# Patient Record
Sex: FEMALE | Employment: FULL TIME
[De-identification: names, ages, dates, MRNs, and addresses within clinical notes are randomized per-mention and may not be internally consistent; named-entity substitution may affect disease eponyms.]

---

## 2024-03-20 ENCOUNTER — TRANSCRIBE ORDERS (OUTPATIENT)
Dept: OTHER | Age: 64
End: 2024-03-20

## 2024-03-20 ENCOUNTER — MEDICAL CORRESPONDENCE (OUTPATIENT)
Dept: HEALTH INFORMATION MANAGEMENT | Facility: CLINIC | Age: 64
End: 2024-03-20

## 2024-03-20 DIAGNOSIS — J32.3 CHRONIC SPHENOIDAL SINUSITIS: Primary | ICD-10-CM

## 2024-03-21 ENCOUNTER — TELEPHONE (OUTPATIENT)
Dept: OTOLARYNGOLOGY | Facility: CLINIC | Age: 64
End: 2024-03-21

## 2024-03-21 NOTE — TELEPHONE ENCOUNTER
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 DX not listed on protocols, no response on secure chat    Action Taken: Message routed to:  [facility] ([facility] ENT    Travel Screening: Not Applicable

## 2024-03-21 NOTE — TELEPHONE ENCOUNTER
Left Voicemail (1st Attempt) for the patient to call back and schedule the following:    Appointment type: New Rhinology  Provider: Danita Altman Boyer  Return date: next available  Specialty phone number: [phone]  Additional appointment(s) needed:   Additonal Notes:

## 2024-03-27 NOTE — TELEPHONE ENCOUNTER
"FUTURE VISIT INFORMATION      FUTURE VISIT INFORMATION:  Date: 7/12/24  Time: 3:20 PM  Location: [facility] -[facility]  REFERRAL INFORMATION:  Referring provider:  Abigail Sanchez MD   Referring providers clinic:  [facility]  Reason for visit/diagnosis:  J32.3 (ICD-10-CM) - Chronic sphenoidal sinusitis, Referral from Abigail Sanchez, Referral notes in EPIC Pt made appt for [facility] location     RECORDS REQUESTED FROM      Clinic name Comments Records Status Imaging Status   [facility] 3/27/24 referral/ OV with Abigail Sanchez MD     2/28/23 ENDSCOPIC SINUS SURGERY PLUS FUSION including right sphenoidotomy with removal of tissue, right meredith bullosa take-down, AND SEPTOPLASTY  CE    [facility] 3/15/24 CT fusion   11/21/22 CT fusion CE PACS           \"Please notify/message CSS if patient completed outside imaging prior to scheduled appointment and/or any outside records that might have been missed at pre visit -Thank you\"  "

## 2024-07-11 PROBLEM — J32.3 CHRONIC SPHENOIDAL SINUSITIS: Status: ACTIVE | Noted: 2024-07-11

## 2024-07-12 ENCOUNTER — PRE VISIT (OUTPATIENT)
Dept: OTOLARYNGOLOGY | Facility: CLINIC | Age: 64
End: 2024-07-12

## 2024-07-12 ENCOUNTER — OFFICE VISIT (OUTPATIENT)
Dept: OTOLARYNGOLOGY | Facility: CLINIC | Age: 64
End: 2024-07-12
Payer: COMMERCIAL

## 2024-07-12 VITALS
DIASTOLIC BLOOD PRESSURE: 71 MMHG | HEIGHT: 64 IN | OXYGEN SATURATION: 97 % | WEIGHT: 207.5 LBS | BODY MASS INDEX: 35.42 KG/M2 | SYSTOLIC BLOOD PRESSURE: 117 MMHG | HEART RATE: 73 BPM

## 2024-07-12 DIAGNOSIS — J32.3 CHRONIC SPHENOIDAL SINUSITIS: Primary | ICD-10-CM

## 2024-07-12 PROCEDURE — 99204 OFFICE O/P NEW MOD 45 MIN: CPT | Performed by: STUDENT IN AN ORGANIZED HEALTH CARE EDUCATION/TRAINING PROGRAM

## 2024-07-12 RX ORDER — DOXYCYCLINE HYCLATE 100 MG
TABLET ORAL
COMMUNITY
Start: 2024-03-13 | End: 2024-07-24

## 2024-07-12 RX ORDER — ERGOCALCIFEROL 1.25 MG/1
CAPSULE, LIQUID FILLED ORAL
COMMUNITY
Start: 2023-10-25 | End: 2024-07-24

## 2024-07-12 RX ORDER — BENZOYL PEROXIDE 10 G/100G
SUSPENSION TOPICAL PRN
COMMUNITY
Start: 2022-11-22

## 2024-07-12 RX ORDER — GABAPENTIN 300 MG/1
300 CAPSULE ORAL 2 TIMES DAILY
COMMUNITY
Start: 2021-01-08

## 2024-07-12 RX ORDER — BUPROPION HYDROCHLORIDE 300 MG/1
300 TABLET ORAL EVERY MORNING
COMMUNITY

## 2024-07-12 RX ORDER — NIRMATRELVIR AND RITONAVIR 300-100 MG
KIT ORAL
COMMUNITY
Start: 2023-09-13 | End: 2024-07-24

## 2024-07-12 RX ORDER — PREDNISONE 20 MG/1
2 TABLET ORAL
COMMUNITY
Start: 2024-04-30

## 2024-07-12 RX ORDER — ALBUTEROL SULFATE 90 UG/1
1-2 AEROSOL, METERED RESPIRATORY (INHALATION) EVERY 4 HOURS PRN
COMMUNITY
Start: 2023-09-25

## 2024-07-12 RX ORDER — MOMETASONE FUROATE 110 UG/1
INHALANT RESPIRATORY (INHALATION)
COMMUNITY
End: 2024-07-24

## 2024-07-12 RX ORDER — MUPIROCIN 20 MG/G
OINTMENT TOPICAL PRN
COMMUNITY
Start: 2023-03-20

## 2024-07-12 RX ORDER — CYCLOBENZAPRINE HCL 10 MG
10 TABLET ORAL 3 TIMES DAILY PRN
COMMUNITY
Start: 2023-10-20

## 2024-07-12 RX ORDER — TRAZODONE HYDROCHLORIDE 50 MG/1
150 TABLET, FILM COATED ORAL AT BEDTIME
COMMUNITY
Start: 2021-06-22 | End: 2025-04-12

## 2024-07-12 RX ORDER — FLUTICASONE PROPIONATE 50 MCG
2 SPRAY, SUSPENSION (ML) NASAL EVERY MORNING
COMMUNITY
Start: 2021-06-06

## 2024-07-12 RX ORDER — TRIAMCINOLONE ACETONIDE 1 MG/G
CREAM TOPICAL
COMMUNITY
Start: 2024-04-12

## 2024-07-12 ASSESSMENT — PAIN SCALES - GENERAL: PAINLEVEL: MILD PAIN (2)

## 2024-07-12 NOTE — LETTER
7/12/2024       RE: Janeen Gregg  [address] [facility] [address]     Dear Colleague,    Thank you for referring your patient, Janeen Gregg, to the [facility] [place] [facility]. Please see a copy of my visit note below.      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  --we discussed that this is often times an incidental finding and does not typically represent an acute infection  --thus, indications for removal vary based on symptom, health status, and other medical issues  --she does have plans to have a knee replaced and was told that it would be the strong preference to have the sinus cleared prior to implanting hardware - I would agree with this evaluation    Plan: IMAGESTREAM RECORDING ORDER    I discussed the risks, benefits, and alternatives to endoscopic sinonasal surgery, including but not limited to: pain, bleeding, infection, CSF leak, orbital injury resulting in vision changes and/or vision loss, septal perforation and/or hematoma, dental hypesthesia, facial numbness, need for continued medical management after surgery, and need for additional procedures, among others. She was allowed to ask questions, which I answered to the best of my ability. After this discussion, surgery was elected.     Chief Complaint: chronic sphenoid sinusitis    History of Present Illness:   Janeen Gregg is a 63 year old female who presents for consultation regarding chronic sphenoid sinusitis    Headache and post nasal drip occasionally  Was found to have likely right sphenoid fungal ball  Underwent right CB  "removal/septoplasty/sphenoid - persistence of symptoms and imaging demonstrated continued fungal ball    Did well after surgery, no major issues    Review of systems: A 14-point review of systems has been conducted and was negative for any notable symptoms, except as dictated in the history of present illness.     Medical History:  No past medical history on file.     Surgical History:   Past Surgical History:   Procedure Laterality Date     IR LUMBAR PUNCTURE  5/21/2021     IR LUMBAR PUNCTURE  9/16/2022     IR LUMBAR PUNCTURE  1/4/2019     IR LUMBAR PUNCTURE  12/18/2020     IR LUMBAR PUNCTURE  1/12/2021        Family History:  No family history on file.     Social History:   Social History     Socioeconomic History     Marital status:      Social Determinants of Health     Financial Resource Strain: Not At Risk (10/20/2023)    Received from [facility]    Financial Resource Strain      Is it hard for you to pay for the very basics like food, housing, medical care or heating?: No   Food Insecurity: Not At Risk (10/20/2023)    Received from [facility]    Food Insecurity      Does your food run out before you have the money to buy more?: No   Transportation Needs: Not At Risk (10/20/2023)    Received from [facility]    Transportation Needs      Does a lack of transportation keep you from your medical appointments or from getting your medications?: No        Physical Exam:  Vital signs: /71 (BP Location: Left arm, Patient Position: Sitting, Cuff Size: Adult Large)   Pulse 73   Ht 1.626 m (5' 4\")   Wt 94.1 kg (207 lb 8 oz)   SpO2 97%   BMI 35.62 kg/m     General Appearance: No acute distress, appropriate demeanor, conversant  Eyes: moist conjunctivae; EOMI; pupils symmetric; visual acuity grossly intact; no proptosis  Head: normocephalic; overall symmetric appearance without deformity  Face: overall symmetric without deformity  Ears: Normal " appearance of external ear; external meatus normal in appearance; TMs intact without perforation bilaterally;   Nose: No external deformity; septum (not significantly deviated) ; inferior turbinates (non hypertrophic)   Oral Cavity/oropharynx: Normal appearance of mucosa; tongue midline; no mass or lesions; oropharynx without obvious mucosal abnormality  Neck: no palpable lymphadenopathy; thyroid without palpable nodules  Lungs: symmetric chest rise; no wheezing  CV: Good distal perfusion; normal hear rate  Extremities: No deformity  Neurologic Exam: Cranial nerves II-XII are grossly intact; no focal deficit    Imaging Review:  3/15/2024 - CT sinus - primary review demonstrates persistent right sphenoid partial opacification with heterogenous signal suggestive of mycetoma/rhinolith    Carlton Samayoa MD    [facility]  Rhinology  Endoscopic Skull Base Surgery  [facility]  Department of Otolaryngology - Head & Neck Surgery        Again, thank you for allowing me to participate in the care of your patient.      Sincerely,    Carlton Samayoa MD

## 2024-07-12 NOTE — NURSING NOTE
"Chief Complaint   Patient presents with    Consult   Blood pressure 117/71, pulse 73, height 1.626 m (5' 4\"), weight 94.1 kg (207 lb 8 oz), SpO2 97%. Nasir Friedman, EMT    "

## 2024-07-12 NOTE — PATIENT INSTRUCTIONS
You were seen in the ENT Clinic today by Dr. Samayoa. If you have any questions or concerns after your appointment, please contact us (see below)    The following has been recommended for you based upon your appointment today:  Surgery schedulers will call you to scheduled your surgery and post op follow up  Pre op assessment within 30 days of surgery with your primary care doctor or our PAC clinic     Please return to clinic 1 week after surgery for post op follow up    How to Contact Us:  Send a [employer] message to your provider. Our team will respond to you via [employer]. Occasionally, we will need to call you to get further information.  For urgent matters (Monday-Friday), call the ENT Clinic: [phone] and speak with a call center team member - they will route your call appropriately.   If you'd like to speak directly with a nurse, please find our contact information below. We do our best to check voicemail frequently throughout the day, and will work to call you back within 1-2 days. For urgent matters, please use the general clinic phone numbers listed above.    Patricia THOMAS RN, BSN   RN Care Coordinator, ENT Clinic  [facility]  Direct: [phone]  Yareli KRUGER LPN  Direct: [phone]            Surgery Instructions - Dr. Samayoa     PREPARING FOR SURGERY  You will need a preoperative assessment within 30 days of your surgery. This can be with your primary care doctor or our PAC clinic.   Before surgery, call the clinic:   If there is any change in your health, or you are having more frequent or severe symptoms   If you develop a cold or flu, or if you test positive for COVID-19   If you have any questions about what to expect before, during, or after surgery   If you need assistance filling out paperwork for short-term disability or FMLA, or you need a letter excusing you from work         MEDICATIONS BEFORE SURGERY  You will receive specific instructions about how to take your medications at  your preoperative assessment visit. Talk to your care team about every medication that you take, including over-the-counter medications and supplements. Some medications can make you bleed too much during surgery, and some change how well anesthesia drugs work. Follow these general instructions for common medications, unless otherwise directed.      INSTRUCTIONS MEDICATION   Hold for 7 days before surgery  Supplements   Multivitamins   Aspirin (note: some medications can contain aspirin, like [place])  Naproxen (Aleve)  Ibuprofen (Advil, Motrin)  Any weight loss medication       Talk with the Preoperative Assessment Center (PAC) about how to take these medications before surgery     Insulin or oral medications for diabetes   Blood pressure medications   Anticoagulant medications, including:    warfarin (Coumadin)   enoxaparin (Lovenox)   dabigatran (Pradaxa)   apixaban (Eliquis)   rivaroxaban (Xarelto)   Antiplatelet medications, including:   clopidogrel bisulfate (Plavix)   cilostazol (Pletal)       Okay to keep taking for pain, as needed     Acetaminophen (Tylenol)          EATING AND DRINKING BEFORE SURGERY  Eat and drink as usual until 8 hours before your surgery arrival time. After that, no food or milk.   Drink clear liquids until 1 hour before your surgery arrival time. Clear liquids are liquids you can see through, like water, Gatorade, and Propel. You may also have black coffee and tea (no cream or milk).   Nothing by mouth within 1 hour of your surgery arrival time. This includes gum, candy, and breath mints.   If your care team tells you take medicine on the morning of surgery, it is okay to take medicine with a sip of water.   Do not drink alcohol for at least 24 hours before surgery. Do not use marijuana for 7 days prior to surgery.        PREVENTING INFECTION  Shower or bathe the night before and morning of your surgery following the instructions on your handout,  Showering Before Surgery.     Note: Only use the special antiseptic soap from your neck to your toes. Your care team will clean the skin at your surgery site.   Don t shave or clip hair near your surgery site. We ll remove the hair if needed.   Having diabetes and/or smoking can cause delayed wound healing and increase your risk of a surgical site infection. Quitting smoking and lowering your blood sugars can make a big difference. If you would like support with this, please let us know or work with your primary care provider.          SMOKING AND NICOTINE  Don t smoke or vape the morning of surgery. You may chew nicotine gum up to 2 hours before surgery. A nicotine patch is okay.   We strongly recommend quitting smoking and other tobacco products. Nicotine can cause delayed healing and wound complications after surgery.         PLANNING AHEAD - FINANCES  [email or web address]  [facility] Billing: Please call [phone], if you wish to pay a bill.  [facility] Financial Counselors: Please call [phone], if you have questions about possible costs and coverage or to discuss options if you don't have enough - or no - insurance for your care.  [facility] Cost of Care Estimates: Please visit the website to view our online estimate tool. If you have additional questions, call [phone] for estimates.  Our financial team will contact your insurance company as soon as surgery is scheduled. If your insurance company requires a prior authorization (pre-approval), our financial team will complete these necessary steps. Typically, we don t encounter many issues with insurance denying coverage for skull base surgery.    It is a good idea to call your insurance company and let them know you re having surgery. Ask questions about your deductible, co-insurance, and what of out-of-pocket costs you will be responsible for.         HEALTHCARE DIRECTIVE  Consider preparing a Health Care  Directive and choosing a Health Care Agent. A Health Care Directive is a written plan outlining your values and priorities for your future medical treatment. A Health Care Agent makes health care decisions based on your wishes if you are unable to communicate.   Download a document, information materials or register for a free class on advance care planning and creating a health care directive by visiting [email or web address], calling [phone], or emailing [email or web address].   If you have a health care directive or choose to complete a healthcare directive before surgery, please upload the document to [employer]. This will be attached to your chart. You may also want to bring a copy with you on the day of surgery.         YOUR SURGERY DAY  Parking:   Both self-park and  parking are available at the [facility] and [facility] buildings. If the Patient Visitors Ramp is full or if a patient or visitor has limited mobility, please follow the signs to the  located at the main entrance. For more information, please visit: [email or web address]    What to bring:  Photo ID and insurance card   Copy of your healthcare directive (if you have one)   Glasses with case (you can t wear contacts during surgery)   Hearing aids with case   If you have a pacemaker, ICD (defibrillator) or other implant, please bring the ID card   If you have an implanted stimulator, please bring the remote control   If you have a legal guardian, bring a copy of the certified (court-stamped) guardianship papers   What to leave at home:  Please remove any jewelry, including body piercings   Leave jewelry and other valuables at home   Medications/supplements         HOME RECOVERY  Everyone's recovery is different based on their health condition, age, and overall health status.   Plan to have an adult stay with you for at least 24 hours after you get home from the hospital.   Arrange  for help at home. It is common to feel tired for the first few days and you will need to avoid some activities. Many people find that having someone help with household tasks, such as cleaning, cooking, and running errands is helpful.          ACTIVITY RESTRICTIONS  Avoid strenuous exercise and activity until your follow up appointment with Dr. Samayoa.  Do not lift anything greater than 10 pounds (about a gallon of milk).       PAIN MANAGEMENT  It is normal to have some pain after surgery. Most people do not experience severe pain. If you are experiencing severe pain at the incision site or severe headaches, please let us know right away.        CALL THE CLINIC IF YOU EXPERIENCE:  Drainage, swelling, fluid collection, or increased redness around the incision   Fever, or temperature of 101 degrees or higher   Pain not managed by your pain medication   Severe constipation or abdominal pain  Running low on prescription medication that was prescribed to you at the time of surgery   Any other symptoms that you have questions about     After clinic hours or on the weekends, please call the hospital  at [phone] and ask to speak with the ENT resident on call. If you have a serious emergency, call 911 or come to the emergency room. If you can, come to the hospital where you had your surgery.      During clinic hours:   Department  Phone    Ear, Nose, & Throat (ENT)     [phone]      RN Care Coordinators:  We do our best to check voicemail frequently throughout the day. For urgent matters, please use the general clinic phone number listed above. Your call will be routed appropriately.      Patricia THOMAS RN, BSN   RN Care Coordinator ENT   Direct: [phone]   Yareli MAHER LPN   Direct: [phone]

## 2024-07-14 ENCOUNTER — PREP FOR PROCEDURE (OUTPATIENT)
Dept: OTOLARYNGOLOGY | Facility: CLINIC | Age: 64
End: 2024-07-14
Payer: COMMERCIAL

## 2024-07-14 DIAGNOSIS — J32.3 CHRONIC SPHENOIDAL SINUSITIS: Primary | ICD-10-CM

## 2024-07-14 RX ORDER — CEFAZOLIN SODIUM 2 G/50ML
2 SOLUTION INTRAVENOUS
Status: CANCELLED | OUTPATIENT
Start: 2024-07-14

## 2024-07-14 RX ORDER — DEXAMETHASONE SODIUM PHOSPHATE 4 MG/ML
10 INJECTION, SOLUTION INTRA-ARTICULAR; INTRALESIONAL; INTRAMUSCULAR; INTRAVENOUS; SOFT TISSUE ONCE
Status: CANCELLED | OUTPATIENT
Start: 2024-07-14 | End: 2024-07-14

## 2024-07-14 RX ORDER — CEFAZOLIN SODIUM 2 G/50ML
2 SOLUTION INTRAVENOUS SEE ADMIN INSTRUCTIONS
Status: CANCELLED | OUTPATIENT
Start: 2024-07-14

## 2024-07-14 NOTE — PROGRESS NOTES
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  --we discussed that this is often times an incidental finding and does not typically represent an acute infection  --thus, indications for removal vary based on symptom, health status, and other medical issues  --she does have plans to have a knee replaced and was told that it would be the strong preference to have the sinus cleared prior to implanting hardware - I would agree with this evaluation    Plan: IMAGESTREAM RECORDING ORDER    I discussed the risks, benefits, and alternatives to endoscopic sinonasal surgery, including but not limited to: pain, bleeding, infection, CSF leak, orbital injury resulting in vision changes and/or vision loss, septal perforation and/or hematoma, dental hypesthesia, facial numbness, need for continued medical management after surgery, and need for additional procedures, among others. She was allowed to ask questions, which I answered to the best of my ability. After this discussion, surgery was elected.     Chief Complaint: chronic sphenoid sinusitis    History of Present Illness:   Janeen Gregg is a 63 year old female who presents for consultation regarding chronic sphenoid sinusitis    Headache and post nasal drip occasionally  Was found to have likely right sphenoid fungal ball  Underwent right CB removal/septoplasty/sphenoid - persistence of symptoms and imaging demonstrated continued fungal ball    Did well after surgery, no major issues    Review of systems: A 14-point review of systems has been conducted and was negative for any notable symptoms, except as dictated in the history of present illness.     Medical History:  No past  "medical history on file.     Surgical History:   Past Surgical History:   Procedure Laterality Date    IR LUMBAR PUNCTURE  5/21/2021    IR LUMBAR PUNCTURE  9/16/2022    IR LUMBAR PUNCTURE  1/4/2019    IR LUMBAR PUNCTURE  12/18/2020    IR LUMBAR PUNCTURE  1/12/2021        Family History:  No family history on file.     Social History:   Social History     Socioeconomic History    Marital status:      Social Determinants of Health     Financial Resource Strain: Not At Risk (10/20/2023)    Received from [employer]    Financial Resource Strain     Is it hard for you to pay for the very basics like food, housing, medical care or heating?: No   Food Insecurity: Not At Risk (10/20/2023)    Received from [employer]    Food Insecurity     Does your food run out before you have the money to buy more?: No   Transportation Needs: Not At Risk (10/20/2023)    Received from [facility]    Transportation Needs     Does a lack of transportation keep you from your medical appointments or from getting your medications?: No        Physical Exam:  Vital signs: /71 (BP Location: Left arm, Patient Position: Sitting, Cuff Size: Adult Large)   Pulse 73   Ht 1.626 m (5' 4\")   Wt 94.1 kg (207 lb 8 oz)   SpO2 97%   BMI 35.62 kg/m     General Appearance: No acute distress, appropriate demeanor, conversant  Eyes: moist conjunctivae; EOMI; pupils symmetric; visual acuity grossly intact; no proptosis  Head: normocephalic; overall symmetric appearance without deformity  Face: overall symmetric without deformity  Ears: Normal appearance of external ear; external meatus normal in appearance; TMs intact without perforation bilaterally;   Nose: No external deformity; septum (not significantly deviated) ; inferior turbinates (non hypertrophic)   Oral Cavity/oropharynx: Normal appearance of mucosa; tongue midline; no mass or lesions; oropharynx without obvious mucosal " abnormality  Neck: no palpable lymphadenopathy; thyroid without palpable nodules  Lungs: symmetric chest rise; no wheezing  CV: Good distal perfusion; normal hear rate  Extremities: No deformity  Neurologic Exam: Cranial nerves II-XII are grossly intact; no focal deficit    Imaging Review:  3/15/2024 - CT sinus - primary review demonstrates persistent right sphenoid partial opacification with heterogenous signal suggestive of mycetoma/rhinolith    Carlton Samayoa MD    [facility]  Rhinology  Endoscopic Skull Base Surgery  [facility]  Department of Otolaryngology - Head & Neck Surgery

## 2024-07-16 ENCOUNTER — TELEPHONE (OUTPATIENT)
Dept: OTOLARYNGOLOGY | Facility: CLINIC | Age: 64
End: 2024-07-16
Payer: COMMERCIAL

## 2024-07-16 NOTE — TELEPHONE ENCOUNTER
FUTURE VISIT INFORMATION      SURGERY INFORMATION:  Date: 24  Location: [facility] OR  Surgeon:  Carlton Samayoa MD   Anesthesia Type:  General  Procedure: Image guided right maxillary antrostomy, total ethmoidectomy, and sphenoidotomy   Consult: ov 24    RECORDS REQUESTED FROM:       Primary Care Provider: Padma Reyes MD  - [facility]    Most recent EKG+ Tracin24- [facility]

## 2024-07-16 NOTE — TELEPHONE ENCOUNTER
Scheduled surgery with Dr. Carlton Samayoa on 7/31/2024    Spoke with: Janeen (patient)    Surgery is located at [facility]    Patient will be seen for their H&P by PAC on 7/24/2024 at 530pm - Provided address & floor number    Does patient need a consult before upcoming surgery? No    Anesthesia type: General    Requested Imaging required for surgery: No    Patient is scheduled for their 1 week post op on 8/9/2024 at 4:25PM with Dr. Carlton Samayoa    Patient will receive their surgery packet via [employer] per their preference    Patient was not provided a start time for surgery & is aware they will receive this information 2-3 days before surgery    Informed patient that they will need a  for surgery. They do not yet know who their  will be and will coordinate before surgery date    Additional comments:      Patient was instructed to call back with any further questions or concerns.     Titi Bradley on 7/16/2024 at 2:58 PM

## 2024-07-24 ENCOUNTER — VIRTUAL VISIT (OUTPATIENT)
Dept: SURGERY | Facility: CLINIC | Age: 64
End: 2024-07-24
Payer: COMMERCIAL

## 2024-07-24 ENCOUNTER — ANESTHESIA EVENT (OUTPATIENT)
Dept: SURGERY | Facility: AMBULATORY SURGERY CENTER | Age: 64
End: 2024-07-24
Payer: COMMERCIAL

## 2024-07-24 ENCOUNTER — PRE VISIT (OUTPATIENT)
Dept: SURGERY | Facility: CLINIC | Age: 64
End: 2024-07-24

## 2024-07-24 VITALS — HEIGHT: 64 IN | WEIGHT: 205 LBS | BODY MASS INDEX: 35 KG/M2

## 2024-07-24 DIAGNOSIS — Z01.818 PRE-OP EVALUATION: Primary | ICD-10-CM

## 2024-07-24 PROCEDURE — 99202 OFFICE O/P NEW SF 15 MIN: CPT | Mod: 95 | Performed by: PHYSICIAN ASSISTANT

## 2024-07-24 RX ORDER — POLYETHYLENE GLYCOL 3350 17 G/17G
1 POWDER, FOR SOLUTION ORAL PRN
COMMUNITY

## 2024-07-24 ASSESSMENT — LIFESTYLE VARIABLES: TOBACCO_USE: 1

## 2024-07-24 ASSESSMENT — ENCOUNTER SYMPTOMS: SEIZURES: 0

## 2024-07-24 ASSESSMENT — PAIN SCALES - GENERAL: PAINLEVEL: MILD PAIN (3)

## 2024-07-24 NOTE — H&P
Pre-Operative H & P     CC:  Preoperative exam to assess for increased cardiopulmonary risk while undergoing surgery and anesthesia.    Date of Encounter: 7/24/2024  Primary Care Physician:  No Ref-Primary, Physician     Reason for visit:   Encounter Diagnosis   Name Primary?    Pre-op evaluation Yes       HPI  Janeen Gregg is a 63 year old female who presents for pre-operative H & P in preparation for  Procedure Information       Case: [id number] Date/Time: 07/31/24 0750    Procedure: Image guided right maxillary antrostomy, total ethmoidectomy, and sphenoidotomy (Right: Nose)    Anesthesia type: General    Diagnosis: Chronic sphenoidal sinusitis [J32.3]    Pre-op diagnosis: Chronic sphenoidal sinusitis [J32.3]    Location: [place] [address] / [facility]    Providers: Carlton Samayoa MD            Patient is being evaluated for comorbid conditions of ANGI, PVCs, dyslipidemia, stress incontinence, obesity     Ms. Gregg has known chronic sinusitis.  She follows with  and is now scheduled for surgery as above.    History is obtained from the patient and chart review    Hx of abnormal bleeding or anti-platelet use: denies    Menstrual history: No LMP recorded. Patient is postmenopausal.     Past Medical History  Past Medical History:   Diagnosis Date    Chronic sinusitis, unspecified location     Depression     Dyslipidemia     Female stress incontinence     Obesity     ANGI (obstructive sleep apnea)     Osteopenia     PVC's (premature ventricular contractions)        Past Surgical History  Past Surgical History:   Procedure Laterality Date    IR LUMBAR PUNCTURE  05/21/2021    IR LUMBAR PUNCTURE  09/16/2022    IR LUMBAR PUNCTURE  01/04/2019    IR LUMBAR PUNCTURE  12/18/2020    IR LUMBAR PUNCTURE  01/12/2021    L TKA      oophrectomy      SINUS SURGERY         Prior to Admission Medications  Current Outpatient Medications   Medication Sig Dispense Refill    albuterol  (PROAIR HFA/PROVENTIL HFA/VENTOLIN HFA) 108 (90 Base) MCG/ACT inhaler Inhale 1-2 puffs into the lungs every 4 hours as needed      benzoyl peroxide (BENZOYL PEROXIDE WASH) 10 % external liquid Apply topically as needed      buPROPion (WELLBUTRIN XL) 300 MG 24 hr tablet Take 300 mg by mouth every morning      cholecalciferol (VITAMIN D3) 25 mcg (1000 units) capsule Take 1 capsule by mouth daily      cyclobenzaprine (FLEXERIL) 10 MG tablet Take 10 mg by mouth 3 times daily as needed      fluticasone (FLONASE) 50 MCG/ACT nasal spray Spray 2 sprays into both nostrils every morning      gabapentin (NEURONTIN) 300 MG capsule Take 300 mg by mouth 2 times daily      mupirocin (BACTROBAN) 2 % external ointment Apply topically as needed      polyethylene glycol (MIRALAX) 17 g packet Take 1 packet by mouth as needed for constipation      traZODone (DESYREL) 50 MG tablet Take 150 mg by mouth at bedtime      triamcinolone (KENALOG) 0.1 % external cream Apply topically two times daily as needed for Other (to patch on scalp).      predniSONE (DELTASONE) 20 MG tablet Take 2 tablets by mouth daily at 2 pm (Patient not taking: Reported on 7/12/2024)         Allergies  No Known Allergies    Social History  Social History     Socioeconomic History    Marital status:      Spouse name: Not on file    Number of children: Not on file    Years of education: Not on file    Highest education level: Not on file   Occupational History    Not on file   Tobacco Use    Smoking status: Former     Types: Cigarettes    Smokeless tobacco: Never   Substance and Sexual Activity    Alcohol use: Not Currently    Drug use: Not Currently    Sexual activity: Not on file   Other Topics Concern    Not on file   Social History Narrative    Not on file     Social Determinants of Health     Financial Resource Strain: Not At Risk (10/20/2023)    Received from [employer], [employer]    Financial Resource Strain     Is it hard for you to pay for the  "very basics like food, housing, medical care or heating?: No   Food Insecurity: Not At Risk (10/20/2023)    [facility] [employer]    Food Insecurity     Does your food run out before you have the money to buy more?: No   Transportation Needs: Not At Risk (10/20/2023)    [facility] [employer]    Transportation Needs     Does a lack of transportation keep you from your medical appointments or from getting your medications?: No   Physical Activity: Not on file   Stress: Not on file   Social Connections: Not on file   Interpersonal Safety: Not on file   Housing Stability: Not on file       Family History  Family History   Problem Relation Age of Onset    Post Operative Nausea and Vomiting Daughter     Deep Vein Thrombosis (DVT) No family hx of        Review of Systems  The complete review of systems is negative other than noted in the HPI or here.   Anesthesia Evaluation   Pt has had prior anesthetic.     No history of anesthetic complications       ROS/MED HX  ENT/Pulmonary:     (+) sleep apnea (mouth gaurd), doesn't use CPAP,              tobacco use, Past use,                       Neurologic:  - neg neurologic ROS  (-) no seizures and no CVA   Cardiovascular:     (+) Dyslipidemia - -   -  - -                                 Previous cardiac testing   Echo: Date: Results:    Stress Test:  Date: Results:    ECG Reviewed:  Date: 2/2024 Results:  NSR  Cath:  Date: Results:   (-) taking anticoagulants/antiplatelets   METS/Exercise Tolerance: 4 - Raking leaves, gardening Comment: On feet at work \"running around.\" Can walk a block or ascend stairs without CP. Some ARREGUIN for over a year since having COVID/ sinus issues    Hematologic:  - neg hematologic  ROS  (-) history of blood clots and history of blood transfusion   Musculoskeletal: Comment: Knee OA      GI/Hepatic:  - neg GI/hepatic ROS  (-) GERD   Renal/Genitourinary:  - neg Renal ROS     Endo:     (+)               Obesity " (BMI 35),    (-) chronic steroid usage   Psychiatric/Substance Use:       Infectious Disease:  - neg infectious disease ROS     Malignancy:       Other:            Virtual visit -  No vitals were obtained    Physical Exam  Constitutional: Awake, alert, cooperative, no apparent distress, and appears stated age.  HENT: Normocephalic  Respiratory: non labored breathing   Neurologic: Awake, alert, oriented to name, place and time.   Neuropsychiatric: Calm, cooperative. Normal affect.      Prior Labs/Diagnostic Studies   All labs and imaging personally reviewed     EKG/ stress test - if available please see in ROS above   No results found.       No data to display                  The patient's records and results personally reviewed by this provider.     Outside records reviewed from: Care Everywhere      Assessment    Janeen Gregg is a 63 year old female seen as a PAC referral for risk assessment and optimization for anesthesia.    Plan/Recommendations  Pt will be optimized for the proposed procedure.  See below for details on the assessment, risk, and preoperative recommendations    NEUROLOGY  - No history of TIA, CVA or seizure  -Post Op delirium risk factors:  No risk identified    ENT  - No current airway concerns.  Will need to be reassessed day of surgery.  Mallampati: Unable to assess  TM: Unable to assess    CARDIAC  - No history of CAD, Hypertension, and Afib  -h/o PVCs, last EKG 2/2024 showed NSR  -some longstanding ARREGUIN since having COVID about a year ago/ sinus symptoms. Denies recent need for inhaler.   - METS (Metabolic Equivalents)  Patient performs 4 or more METS exercise without symptoms             Total Score: 0      RCRI-Very low risk: Class 1 0.4% complication rate             Total Score: 0        PULMONARY  - Obstructive Sleep Apnea  ANGI without home CPAP use.  -denies asthma  - Tobacco History    History   Smoking Status    Former    Types: Cigarettes   Smokeless Tobacco    Never  "      GI  PONV High Risk  Total Score: 3           1 AN PONV: Pt is Female    1 AN PONV: Patient is not a current smoker    1 AN PONV: Intended Post Op Opioids        /RENAL  - Baseline Creatinine WNL    ENDOCRINE    - BMI: Estimated body mass index is 35.19 kg/m  as calculated from the following:    Height as of this encounter: 1.626 m (5' 4\").    Weight as of this encounter: 93 kg (205 lb).  Obesity (BMI >30)  - No history of Diabetes Mellitus    HEME  VTE Low Risk 0.26%             Total Score: 1    VTE: Greater than 59 yrs old      - No history of abnormal bleeding or antiplatelet use.    MSK  Patient is NOT Frail             Total Score: 1    Frailty: Slower walking speed      PM&R referral not indicated  -knee OA. Plan for orthopedic surgery at OSH once healed form sinus procedure     Different anesthesia methods/types have been discussed with the patient, but they are aware that the final plan will be decided by the assigned anesthesia provider on the date of service.      The patient is optimized for their procedure. AVS with information on surgery time/arrival time, meds and NPO status given by nursing staff. No further diagnostic testing indicated.    Please refer to the physical examination documented by the anesthesiologist in the anesthesia record on the day of surgery.    Video-Visit Details    Type of service:  Video Visit    Provider received verbal consent for a Video Visit from the patient? Yes     Originating Location (pt. Location): Home    Distant Location (provider location):  Off-site  Mode of Communication:  Video Conference via [employer]  On the day of service:     Prep time: 6 minutes  Visit time: 8 minutes  Documentation time: 7 minutes  ------------------------------------------  Total time: 21 minutes      Lizzy Barroso PA-C  Preoperative Assessment Center  [facility]  Clinic and Surgery Center  Phone: [phone]  Fax: [phone]    "

## 2024-07-24 NOTE — PROGRESS NOTES
Janeen is a 63 year old who is being evaluated via a billable video visit.    How would you like to obtain your AVS? MyChart  If the video visit is dropped, the invitation should be resent by: Text to cell phone: [phone]      Objective    Vitals - Patient Reported  Pain Score: Mild Pain (3)  Pain Loc: Head    
Tigist Edwards

## 2024-07-24 NOTE — PATIENT INSTRUCTIONS
Preparing for Your Surgery      Name:  Janeen Gregg   MRN:  [id number]   :  1960   Today's Date:  2024       Arriving for surgery:  Surgery date:  24  Arrival time:  06:20 am    Please come to:     [facility] [address] [address]     Parking is available in front of the [facility] building from 5:30AM to 8:00PM.  -  Proceed to the 5th floor to check into the Ambulatory Surgery Center.              >> There will be patient concierges on the 1st and 5th floor, for assistance or an escort, if you would like.              >> Please call [phone] with any questions.    What can I eat or drink?  -  You may eat and drink normally up to 8 hours prior to arrival time.   -  You may have clear liquids until 2 hours prior to arrival time.     Examples of clear liquids:  Water  Clear broth  Juices (apple, white grape, white cranberry  and cider) without pulp  Noncarbonated, powder based beverages  (lemonade and Abdelrahman-Aid)  Sodas (Sprite, 7-Up, ginger ale and seltzer)  Coffee or tea (without milk or cream)  Gatorade    -  No Alcohol or cannabis products for at least 24 hours before surgery.     Which medicines can I take?    Hold Aspirin for 7 days before surgery.   Hold Multivitamins for 7 days before surgery.  Hold Supplements for 7 days before surgery.  Hold Ibuprofen (Advil, Motrin) for 1 day(s) before surgery--unless otherwise directed by surgeon.  Hold Naproxen (Aleve) for 4 days before surgery.    -  DO NOT take these medications the day of surgery:  Miralax and Vitamin D3.    -  PLEASE TAKE these medications the day of surgery:  Tylenol or flexeril if needed; take wellbutrin, gabapentin.    How do I prepare myself?  - Please take 2 showers (one the night prior to surgery and one the morning of surgery) using Scrubcare or Hibiclens soap.    Use this soap only from the neck to your toes.     Leave the soap  on your skin for one minute--then rinse thoroughly.      You may use your own shampoo and conditioner. No other hair products.   - Please remove all jewelry and body piercings.  - No lotions, deodorants or fragrance.  - No makeup or fingernail polish.   - Bring your ID and insurance card.    -If you use a CPAP machine, please bring the CPAP machine, tubing, and mask to hospital.    -If you have a Deep Brain Stimulator, Spinal Cord Stimulator, or any Neuro Stimulator device---you must bring the remote control to the hospital.      ALL PATIENTS GOING HOME THE SAME DAY OF SURGERY ARE REQUIRED TO HAVE A RESPONSIBLE ADULT TO DRIVE AND BE IN ATTENDANCE WITH THEM FOR 24 HOURS FOLLOWING SURGERY.    Covid testing policy as of 12/06/2022  Your surgeon will notify and schedule you for a COVID test if one is needed before surgery--please direct any questions or COVID symptoms to your surgeon      Questions or Concerns:    - For any questions regarding the day of surgery or your hospital stay, please contact the Pre Admission Nursing Office at [phone].       - If you have health changes between today and your surgery, please call your surgeon.       - For questions after surgery, please call your surgeons office.           Current Visitor Guidelines    You may have 2 visitors in the pre op area.    Visiting hours: 8 a.m. to 8:30 p.m.    Patients confirmed or suspected to have symptoms of COVID 19 or flu:     No visitors allowed for adult patients.   Children (under age 18) can have 1 named visitor.     People who are sick or showing symptoms of COVID 19 or flu:    Are not allowed to visit patients--we can only make exceptions in special situations.       Please follow these guidelines for your visit:          Please maintain social distance          Masking is optional--however at times you may be asked to wear a mask for the safety of yourself and others     Clean your hands with alcohol hand . Do this when you  arrive at and leave the building and patient room,    And again after you touch your mask or anything in the room.     Go directly to and from the room you are visiting.     Stay in the patient s room during your visit. Limit going to other places in the hospital as much as possible     Leave bags and jackets at home or in the car.     For everyone s health, please don t come and go during your visit. That includes for smoking   during your visit.

## 2024-07-31 ENCOUNTER — ANESTHESIA (OUTPATIENT)
Dept: SURGERY | Facility: AMBULATORY SURGERY CENTER | Age: 64
End: 2024-07-31
Payer: COMMERCIAL

## 2024-07-31 ENCOUNTER — HOSPITAL ENCOUNTER (OUTPATIENT)
Facility: AMBULATORY SURGERY CENTER | Age: 64
Discharge: HOME | End: 2024-07-31
Attending: STUDENT IN AN ORGANIZED HEALTH CARE EDUCATION/TRAINING PROGRAM | Admitting: STUDENT IN AN ORGANIZED HEALTH CARE EDUCATION/TRAINING PROGRAM
Payer: COMMERCIAL

## 2024-07-31 VITALS
OXYGEN SATURATION: 95 % | DIASTOLIC BLOOD PRESSURE: 70 MMHG | HEART RATE: 73 BPM | TEMPERATURE: 97 F | BODY MASS INDEX: 34.15 KG/M2 | WEIGHT: 200 LBS | HEIGHT: 64 IN | RESPIRATION RATE: 16 BRPM | SYSTOLIC BLOOD PRESSURE: 132 MMHG

## 2024-07-31 DIAGNOSIS — J32.3 CHRONIC SPHENOIDAL SINUSITIS: Primary | ICD-10-CM

## 2024-07-31 DIAGNOSIS — G89.18 ACUTE POST-OPERATIVE PAIN: ICD-10-CM

## 2024-07-31 PROCEDURE — 31231 NASAL ENDOSCOPY DX: CPT | Performed by: STUDENT IN AN ORGANIZED HEALTH CARE EDUCATION/TRAINING PROGRAM

## 2024-07-31 PROCEDURE — 88305 TISSUE EXAM BY PATHOLOGIST: CPT | Mod: 26 | Performed by: STUDENT IN AN ORGANIZED HEALTH CARE EDUCATION/TRAINING PROGRAM

## 2024-07-31 PROCEDURE — 88305 TISSUE EXAM BY PATHOLOGIST: CPT | Mod: TC | Performed by: STUDENT IN AN ORGANIZED HEALTH CARE EDUCATION/TRAINING PROGRAM

## 2024-07-31 PROCEDURE — 31231 NASAL ENDOSCOPY DX: CPT | Performed by: NURSE ANESTHETIST, CERTIFIED REGISTERED

## 2024-07-31 PROCEDURE — 31256 EXPLORATION MAXILLARY SINUS: CPT | Mod: RT

## 2024-07-31 PROCEDURE — 31259 NSL/SINS NDSC SPHN TISS RMVL: CPT | Mod: RT

## 2024-07-31 RX ORDER — HYDROCODONE BITARTRATE AND ACETAMINOPHEN 5; 325 MG/1; MG/1
1 TABLET ORAL EVERY 6 HOURS PRN
Qty: 10 TABLET | Refills: 0 | Status: SHIPPED | OUTPATIENT
Start: 2024-07-31 | End: 2024-08-03

## 2024-07-31 RX ORDER — NALOXONE HYDROCHLORIDE 0.4 MG/ML
0.1 INJECTION, SOLUTION INTRAMUSCULAR; INTRAVENOUS; SUBCUTANEOUS
Status: DISCONTINUED | OUTPATIENT
Start: 2024-07-31 | End: 2024-08-01 | Stop reason: HOSPADM

## 2024-07-31 RX ORDER — DEXAMETHASONE SODIUM PHOSPHATE 4 MG/ML
INJECTION, SOLUTION INTRA-ARTICULAR; INTRALESIONAL; INTRAMUSCULAR; INTRAVENOUS; SOFT TISSUE PRN
Status: DISCONTINUED | OUTPATIENT
Start: 2024-07-31 | End: 2024-07-31

## 2024-07-31 RX ORDER — ONDANSETRON 2 MG/ML
4 INJECTION INTRAMUSCULAR; INTRAVENOUS EVERY 30 MIN PRN
Status: DISCONTINUED | OUTPATIENT
Start: 2024-07-31 | End: 2024-08-01 | Stop reason: HOSPADM

## 2024-07-31 RX ORDER — ACETAMINOPHEN 325 MG/1
975 TABLET ORAL ONCE
Status: COMPLETED | OUTPATIENT
Start: 2024-07-31 | End: 2024-07-31

## 2024-07-31 RX ORDER — ONDANSETRON 2 MG/ML
INJECTION INTRAMUSCULAR; INTRAVENOUS PRN
Status: DISCONTINUED | OUTPATIENT
Start: 2024-07-31 | End: 2024-07-31

## 2024-07-31 RX ORDER — LIDOCAINE 40 MG/G
CREAM TOPICAL
Status: DISCONTINUED | OUTPATIENT
Start: 2024-07-31 | End: 2024-08-01 | Stop reason: HOSPADM

## 2024-07-31 RX ORDER — LIDOCAINE HYDROCHLORIDE AND EPINEPHRINE 10; 10 MG/ML; UG/ML
INJECTION, SOLUTION INFILTRATION; PERINEURAL PRN
Status: DISCONTINUED | OUTPATIENT
Start: 2024-07-31 | End: 2024-07-31 | Stop reason: HOSPADM

## 2024-07-31 RX ORDER — CEFAZOLIN SODIUM 2 G/50ML
2 SOLUTION INTRAVENOUS
Status: COMPLETED | OUTPATIENT
Start: 2024-07-31 | End: 2024-07-31

## 2024-07-31 RX ORDER — CEFAZOLIN SODIUM 2 G/50ML
2 SOLUTION INTRAVENOUS SEE ADMIN INSTRUCTIONS
Status: DISCONTINUED | OUTPATIENT
Start: 2024-07-31 | End: 2024-08-01 | Stop reason: HOSPADM

## 2024-07-31 RX ORDER — ONDANSETRON 4 MG/1
4 TABLET, ORALLY DISINTEGRATING ORAL EVERY 30 MIN PRN
Status: DISCONTINUED | OUTPATIENT
Start: 2024-07-31 | End: 2024-08-01 | Stop reason: HOSPADM

## 2024-07-31 RX ORDER — DEXAMETHASONE SODIUM PHOSPHATE 10 MG/ML
4 INJECTION, SOLUTION INTRAMUSCULAR; INTRAVENOUS
Status: DISCONTINUED | OUTPATIENT
Start: 2024-07-31 | End: 2024-08-01 | Stop reason: HOSPADM

## 2024-07-31 RX ORDER — PROPOFOL 10 MG/ML
INJECTION, EMULSION INTRAVENOUS PRN
Status: DISCONTINUED | OUTPATIENT
Start: 2024-07-31 | End: 2024-07-31

## 2024-07-31 RX ORDER — SODIUM CHLORIDE, SODIUM LACTATE, POTASSIUM CHLORIDE, CALCIUM CHLORIDE 600; 310; 30; 20 MG/100ML; MG/100ML; MG/100ML; MG/100ML
INJECTION, SOLUTION INTRAVENOUS CONTINUOUS
Status: DISCONTINUED | OUTPATIENT
Start: 2024-07-31 | End: 2024-08-01 | Stop reason: HOSPADM

## 2024-07-31 RX ORDER — FENTANYL CITRATE 50 UG/ML
INJECTION, SOLUTION INTRAMUSCULAR; INTRAVENOUS PRN
Status: DISCONTINUED | OUTPATIENT
Start: 2024-07-31 | End: 2024-07-31

## 2024-07-31 RX ORDER — HYDROMORPHONE HYDROCHLORIDE 1 MG/ML
0.2 INJECTION, SOLUTION INTRAMUSCULAR; INTRAVENOUS; SUBCUTANEOUS EVERY 5 MIN PRN
Status: DISCONTINUED | OUTPATIENT
Start: 2024-07-31 | End: 2024-08-01 | Stop reason: HOSPADM

## 2024-07-31 RX ORDER — OXYMETAZOLINE HYDROCHLORIDE 0.05 G/100ML
SPRAY NASAL PRN
Status: DISCONTINUED | OUTPATIENT
Start: 2024-07-31 | End: 2024-07-31 | Stop reason: HOSPADM

## 2024-07-31 RX ORDER — FENTANYL CITRATE 50 UG/ML
25 INJECTION, SOLUTION INTRAMUSCULAR; INTRAVENOUS EVERY 5 MIN PRN
Status: DISCONTINUED | OUTPATIENT
Start: 2024-07-31 | End: 2024-08-01 | Stop reason: HOSPADM

## 2024-07-31 RX ORDER — LIDOCAINE HYDROCHLORIDE 20 MG/ML
INJECTION, SOLUTION INFILTRATION; PERINEURAL PRN
Status: DISCONTINUED | OUTPATIENT
Start: 2024-07-31 | End: 2024-07-31

## 2024-07-31 RX ORDER — OXYCODONE HYDROCHLORIDE 5 MG/1
10 TABLET ORAL
Status: DISCONTINUED | OUTPATIENT
Start: 2024-07-31 | End: 2024-08-01 | Stop reason: HOSPADM

## 2024-07-31 RX ORDER — PROPOFOL 10 MG/ML
INJECTION, EMULSION INTRAVENOUS CONTINUOUS PRN
Status: DISCONTINUED | OUTPATIENT
Start: 2024-07-31 | End: 2024-07-31

## 2024-07-31 RX ORDER — FENTANYL CITRATE 50 UG/ML
50 INJECTION, SOLUTION INTRAMUSCULAR; INTRAVENOUS EVERY 5 MIN PRN
Status: DISCONTINUED | OUTPATIENT
Start: 2024-07-31 | End: 2024-08-01 | Stop reason: HOSPADM

## 2024-07-31 RX ORDER — OXYCODONE HYDROCHLORIDE 5 MG/1
5 TABLET ORAL
Status: COMPLETED | OUTPATIENT
Start: 2024-07-31 | End: 2024-07-31

## 2024-07-31 RX ORDER — HYDROMORPHONE HYDROCHLORIDE 1 MG/ML
0.4 INJECTION, SOLUTION INTRAMUSCULAR; INTRAVENOUS; SUBCUTANEOUS EVERY 5 MIN PRN
Status: DISCONTINUED | OUTPATIENT
Start: 2024-07-31 | End: 2024-08-01 | Stop reason: HOSPADM

## 2024-07-31 RX ORDER — DEXAMETHASONE SODIUM PHOSPHATE 10 MG/ML
10 INJECTION, SOLUTION INTRAMUSCULAR; INTRAVENOUS ONCE
Status: DISCONTINUED | OUTPATIENT
Start: 2024-07-31 | End: 2024-08-01 | Stop reason: HOSPADM

## 2024-07-31 RX ADMIN — ACETAMINOPHEN 975 MG: 325 TABLET ORAL at 07:10

## 2024-07-31 RX ADMIN — FENTANYL CITRATE 100 MCG: 50 INJECTION, SOLUTION INTRAMUSCULAR; INTRAVENOUS at 07:56

## 2024-07-31 RX ADMIN — Medication 20 MG: at 08:49

## 2024-07-31 RX ADMIN — DEXAMETHASONE SODIUM PHOSPHATE 10 MG: 4 INJECTION, SOLUTION INTRA-ARTICULAR; INTRALESIONAL; INTRAMUSCULAR; INTRAVENOUS; SOFT TISSUE at 08:00

## 2024-07-31 RX ADMIN — Medication 50 MG: at 07:56

## 2024-07-31 RX ADMIN — LIDOCAINE HYDROCHLORIDE 100 MG: 20 INJECTION, SOLUTION INFILTRATION; PERINEURAL at 07:56

## 2024-07-31 RX ADMIN — CEFAZOLIN SODIUM 2 G: 2 SOLUTION INTRAVENOUS at 07:43

## 2024-07-31 RX ADMIN — ONDANSETRON 4 MG: 2 INJECTION INTRAMUSCULAR; INTRAVENOUS at 09:41

## 2024-07-31 RX ADMIN — Medication 100 MCG: at 08:13

## 2024-07-31 RX ADMIN — PROPOFOL 150 MCG/KG/MIN: 10 INJECTION, EMULSION INTRAVENOUS at 07:56

## 2024-07-31 RX ADMIN — PROPOFOL 150 MCG/KG/MIN: 10 INJECTION, EMULSION INTRAVENOUS at 09:14

## 2024-07-31 RX ADMIN — OXYCODONE HYDROCHLORIDE 5 MG: 5 TABLET ORAL at 10:36

## 2024-07-31 RX ADMIN — PROPOFOL 200 MG: 10 INJECTION, EMULSION INTRAVENOUS at 07:56

## 2024-07-31 RX ADMIN — PROPOFOL 150 MCG/KG/MIN: 10 INJECTION, EMULSION INTRAVENOUS at 08:34

## 2024-07-31 RX ADMIN — Medication 10 MG: at 09:24

## 2024-07-31 RX ADMIN — SODIUM CHLORIDE, SODIUM LACTATE, POTASSIUM CHLORIDE, CALCIUM CHLORIDE: 600; 310; 30; 20 INJECTION, SOLUTION INTRAVENOUS at 07:11

## 2024-07-31 RX ADMIN — Medication 0.5 MG: at 07:52

## 2024-07-31 NOTE — OP NOTE
[facility]  Department of Otolaryngology - Head & Neck Surgery  Division of Rhinology and Skull Base Surgery  Operative report     Procedure date: July 31, 2024     Preoperative diagnosis:  Right chronic sphenoid sinusitis with mycetoma/rhinolith     Postoperative diagnosis:  Same as preop     Procedures performed:  1.  Right endoscopic maxillary antrostomy  2.  Right endoscopic ethmoidectomy, total  3.  Right endoscopic sphenoidotomy with removal of rhinolith/mycetoma  4.  Right inferior turbinate outfracture  5.  Use of intraoperative image guidance    Findings:   Complete removal of right sphenoid rhinolith/mycetoma    Surgeon: Carlton Samayoa MD      Assistants:  1.  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  She underwent previous surgery at an outside hospital with post operative imaging demonstrating persistence of the mycetoma.  Given her symptoms, persistence of disease, and need for clearance for an upcoming joint replacement surgery, revision endoscopic sinus surgery was recommended.  After a full discussion of the risks benefits and alternatives the patient was amenable with proceeding and informed consent was obtained.      Procedure in detail: The patient was identified in the preoperative holding area and informed consent was confirmed.  The patient was then brought to the operating room and placed supine on the operating room table.  A surgical time out was held.  Care of the patient was then turned over to the anesthesia team and she underwent induction of general anesthesia with endotracheal intubation.  The patient was padded appropriately.  The table was turned 180 degrees.  The intraoperative image guidance system was applied, calibrated, and accuracy was confirmed.  The patient was prepped and draped in the usual fashion.  The patient received  antibiotics for prophylactics.  A timeout was performed confirm details of the procedure.     Oxymetazoline was instilled in the nasal cavity for topical decongestion.  We began with 0 degree inspection of the nasal cavity.  A Boies elevator was used to outfracture the right inferior turbinate.  A freer elevator was used to medialize the right middle turbinate.  5 ml's of 1% lidocaine with 1:100K epinephrine was injected into the middle turbinate, superior turbinate, lateral nasal wall, and portions of the septum for additional decongestion.  Afrin soaked pledgets were placed for additional decongestion in the middle meatus.    We began on the right.  The free edge of the uncinate process was identified and medialized with a ball tip probe.  It was then resected with the backbiter and the microdebrider.  The natural os of the maxillary sinus was identified and cannulated with the ball probe and then gently dilated.  A tear drop shaped antrostomy was then created with through cutting instruments and the debrider.  The natural os of the ethmoid bulla was identified and a j-curette was used to resect the front face of the ethmoid bulla.  Through cutting instruments, debrider, and kerrisons were used to remove residual anterior ethmoid partitions.  The basal lamella was identified and an incised at the level of the roof of the maxillary sinus at the junction of the vertical and horizontal portions.  The inferior two thirds of the superior turbinate were resected with through cutting instruments.  The patient was noted to have a supreme turbinate that was scarred to the septum.  This scar was lysed and the supreme turbinate was taken down. The posterior ethmoid cells were entered and partitions were removed allowing identification of the lamina papyracea, skull base and sphenoid face.  The sphenoid os was identified and cannulated.  Kerrisons were used to remove the sphenoid face while sparing the sphenoid face mucosa  inferiorly.   Endoscopic evaluation revealed an obvious mycetoma filling the clival recess.  This was removed.  The sinus was rinsed copiously with saline and inspected 30 degree endoscope.  No residual mycetoma was identified.       After hemostasis had been achieved and all bone chips were removed nasapore was tucked into the middle meatus to medialize the middle turbinates.  An orogastric tube was passed.  Care of the patient was then turned back over the anesthesia team.  The patient underwent an uneventful recovery from general anesthesia and was successfully extubated.  The patient was then transported to the postoperative care unit for additional care.      I was scrubbed for the entirety of the procedure, performed all the key portions, and directly supervised all resident participation.        Carlton Samayoa MD

## 2024-07-31 NOTE — ANESTHESIA PREPROCEDURE EVALUATION
Anesthesia Pre-Procedure Evaluation    Patient: Janeen Gregg   MRN: [id number] : 1960        Procedure : Procedure(s):  Image guided right maxillary antrostomy, total ethmoidectomy, and sphenoidotomy          Past Medical History:   Diagnosis Date    Chronic sinusitis, unspecified location     Depression     Dyslipidemia     Female stress incontinence     Obesity     ANGI (obstructive sleep apnea)     Osteopenia     PVC's (premature ventricular contractions)       Past Surgical History:   Procedure Laterality Date    IR LUMBAR PUNCTURE  2021    IR LUMBAR PUNCTURE  2022    IR LUMBAR PUNCTURE  2019    IR LUMBAR PUNCTURE  2020    IR LUMBAR PUNCTURE  2021    L TKA      oophrectomy      SINUS SURGERY        No Known Allergies   Social History     Tobacco Use    Smoking status: Former     Types: Cigarettes    Smokeless tobacco: Never   Substance Use Topics    Alcohol use: Not Currently      Wt Readings from Last 1 Encounters:   24 90.7 kg (200 lb)        Anesthesia Evaluation   Pt has had prior anesthetic. Type: Regional and General.    No history of anesthetic complications       ROS/MED HX  ENT/Pulmonary:  - neg pulmonary ROS  (-) sleep apnea   Neurologic:  - neg neurologic ROS     Cardiovascular:  - neg cardiovascular ROS     METS/Exercise Tolerance: >4 METS    Hematologic:  - neg hematologic  ROS     Musculoskeletal:  - neg musculoskeletal ROS     GI/Hepatic:  - neg GI/hepatic ROS     Renal/Genitourinary:  - neg Renal ROS     Endo:  - neg endo ROS     Psychiatric/Substance Use:  - neg psychiatric ROS     Infectious Disease:  - neg infectious disease ROS     Malignancy:  - neg malignancy ROS     Other:  - neg other ROS          Physical Exam    Airway        Mallampati: II   TM distance: > 3 FB   Neck ROM: full   Mouth opening: > 3 cm    Respiratory Devices and Support         Dental       (+) Minor Abnormalities - some fillings, tiny chips      Cardiovascular    "cardiovascular exam normal          Pulmonary   pulmonary exam normal                OUTSIDE LABS:  CBC: No results found for: \"WBC\", \"HGB\", \"HCT\", \"PLT\"  BMP: No results found for: \"NA\", \"POTASSIUM\", \"CHLORIDE\", \"CO2\", \"BUN\", \"CR\", \"GLC\"  COAGS: No results found for: \"PTT\", \"INR\", \"FIBR\"  POC: No results found for: \"BGM\", \"HCG\", \"HCGS\"  HEPATIC: No results found for: \"ALBUMIN\", \"PROTTOTAL\", \"ALT\", \"AST\", \"GGT\", \"ALKPHOS\", \"BILITOTAL\", \"BILIDIRECT\", \"LEIGHTON\"  OTHER: No results found for: \"PH\", \"LACT\", \"A1C\", \"KEYSHAWN\", \"PHOS\", \"MAG\", \"LIPASE\", \"AMYLASE\", \"TSH\", \"T4\", \"T3\", \"CRP\", \"SED\"    Anesthesia Plan    ASA Status:  2    NPO Status:  NPO Appropriate    Anesthesia Type: General.     - Airway: ETT   Induction: Propofol, Intravenous.   Maintenance: TIVA.        Consents    Anesthesia Plan(s) and associated risks, benefits, and realistic alternatives discussed. Questions answered and patient/representative(s) expressed understanding.     - Discussed:     - Discussed with:  Patient            Postoperative Care    Pain management: IV analgesics, Oral pain medications, Multi-modal analgesia.   PONV prophylaxis: Ondansetron (or other 5HT-3), Dexamethasone or Solumedrol, Background Propofol Infusion     Comments:    Other Comments: Discussed risks of general anesthesia, including aspiration pneumonia, sore throat/hoarse voice, abrasions/damage to lips/tongue/teeth, nausea, rare complications (including medication reactions, cardiac, pulmonary, hypoxia/low oxygen, recall). Ensured understanding, invited questions and all questions were answered. Patient wishes to proceed.               Maxime Taylor MD    I have reviewed the pertinent notes and labs in the chart from the past 30 days and (re)examined the patient.  Any updates or changes from those notes are reflected in this note.              # Obesity: Estimated body mass index is 34.33 kg/m  as calculated from the following:    Height as of this encounter: 1.626 m (5' " "4\").    Weight as of this encounter: 90.7 kg (200 lb).      "

## 2024-07-31 NOTE — ANESTHESIA POSTPROCEDURE EVALUATION
Patient: Janeen Gregg    Procedure: Procedure(s):  Image guided right maxillary antrostomy, total ethmoidectomy, and sphenoidotomy       Anesthesia Type:  General    Note:  Disposition: Outpatient   Postop Pain Control: Uneventful            Sign Out: Well controlled pain   PONV: No   Neuro/Psych: Uneventful            Sign Out: Acceptable/Baseline neuro status   Airway/Respiratory: Uneventful            Sign Out: Acceptable/Baseline resp. status   CV/Hemodynamics: Uneventful            Sign Out: Acceptable CV status; No obvious hypovolemia; No obvious fluid overload   Other NRE: NONE   DID A NON-ROUTINE EVENT OCCUR? No           Last vitals:  Vitals Value Taken Time   /80 07/31/24 1015   Temp 36.1  C (97  F) 07/31/24 1006   Pulse 71 07/31/24 1018   Resp 7 07/31/24 1018   SpO2 97 % 07/31/24 1018   Vitals shown include unfiled device data.    Electronically Signed By: Maxime Taylor MD  July 31, 2024  10:19 AM

## 2024-07-31 NOTE — ANESTHESIA PROCEDURE NOTES
Airway       Patient location during procedure: OR       Procedure Start/Stop Times: 7/31/2024 7:59 AM  Staff -        Performed By: CRNA  Consent for Airway        Urgency: elective  Indications and Patient Condition       Indications for airway management: socrates-procedural and airway protection       Induction type:intravenous       Mask difficulty assessment: 1 - vent by mask    Final Airway Details       Final airway type: endotracheal airway       Successful airway: ETT - single  Endotracheal Airway Details        ETT size (mm): 7.5       Cuffed: yes       Successful intubation technique: direct laryngoscopy       DL Blade Type: MAC 3       Grade View of Cords: 1       Adjucts: stylet       Position: Left       Measured from: gums/teeth       Secured at (cm): 23       Bite block used: None    Post intubation assessment        Placement verified by: capnometry, equal breath sounds and chest rise        Number of attempts at approach: 1       Number of other approaches attempted: 0       Secured with: tape       Ease of procedure: easy       Dentition: Intact and Unchanged    Medication(s) Administered   Medication Administration Time: 7/31/2024 7:59 AM

## 2024-07-31 NOTE — DISCHARGE INSTRUCTIONS
Tylenol 975 mg given at 7AM.   Ok to take more after 1PM.    Recommended Tylenol/Ibuprofen schedule:   TYLENOL: 1PM  7PM  1AM  7AM  IBUPROFEN: 4PM  10PM  4AM  10AM    What to expect:    Following this surgery your nose will be sore and congested.  You will likely have bloody nasal drainage and may pass large blood clots - this is normal.  You have dissolvable packing tucked inside your nose to help reduce bleeding and to keep the sinuses open; this will be removed at your follow up appointment.     What to do:  You can use tylenol and motrin around the clock for pain; you can use the prescription pain medication for severe pain (see schedule above)  Please start using the NeilMed Sinus rinse the night of surgery - fill the bottle to the dotted line with distilled water and empty one salt packet in the bottle.  Warm the bottle up in the microwave so the saline is warm but not hot or boiling.  Place the black nozzle in the front of the nose, tuck your chin down while standing over the sink, and slowly and gently squeeze.  This will push the saline up into your nose; it may come out the same side, the other side, or out of your mouth, all are fine.  The goal is to gently flush the nose out to help keep your nose moisturized and open.  Please use this at least twice a day; you may use it more frequently if it feels comforting/soothing and or if you are getting a lot of debris out with the rinses (some people will use it 6-7 times a day in the post-operative period).  Please run a humidifier at the bedside overnight  Please do not place anything in the nose till you see Dr. Samayoa back in the office  In case of a nose bleed please spray afrin in the nose (3-4 sprays on both sides) and gently hold pressure on the outside portion of the nose.  If bleeding persists or worsens please call the office for more advice.  Please refrain from heavy lifting or strenuous activity following surgery (no more than about a gallon of  milk), though we encourage you to be up and walking (no bed rest).  It helps to sleep with the head of bed elevated by about 30 degrees.  Please try and keep your head above your heart to minimize the amount of strain inside the nose.  Please follow these activity restrictions until you see Dr. Samayoa back in the office for your post-operative visit.  You may return to regular activity and/or work earlier if we specifically discussed that it is safe prior to surgery.            [facility]  Home Care Following Anesthesia  For 24 hours after surgery:  Get plenty of rest.  A responsible adult must stay with you for at least 24 hours after you leave the surgery center.  Do not drive or use heavy equipment.  If you have weakness or tingling, don't drive or use heavy equipment until this feeling goes away.   Do not drink alcohol.   Avoid strenuous or risky activities.  Ask for help when climbing stairs.  You may feel lightheaded.  IF so, sit for a few minutes before standing.  Have someone help you get up.   If you have nausea (feel sick to your stomach): Drink only clear liquids such as apple juice, ginger ale, broth or 7-Up.  Rest may also help.  Be sure to drink enough fluids.  Move to a regular diet as you feel able.   You may have a slight fever.  Call the doctor if your fever is over 100 F (37.7 C) (taken under the tongue) or lasts longer than 24 hours.  You may have a dry mouth, a sore throat, muscle aches or trouble sleeping. These should go away after 24 hours.  Do not make important or legal decisions.   It is recommended to avoid smoking.               Tips for taking pain medications  To get the best pain relief possible, remember these points:  Take pain medications as directed, before pain becomes severe.  Pain medication can upset your stomach: taking it with food may help.  Constipation is a common side effect of pain medication. Drink plenty of  fluids.  Eat foods high  in fiber. Take a stool softener if recommended by your doctor or pharmacist.  Do not drink alcohol, drive or operate machinery while taking pain medications.  Ask about other ways to control pain, such as with heat, ice or relaxation.    Tylenol/Acetaminophen Consumption    If you feel your pain relief is insufficient, you may take Tylenol/Acetaminophen in addition to your narcotic pain medication.   Be careful not to exceed 4,000 mg of Tylenol/Acetaminophen in a 24 hour period from all sources.  If you are taking extra strength Tylenol/acetaminophen (500 mg), the maximum dose is 8 tablets in 24 hours.  If you are taking regular strength acetaminophen (325 mg), the maximum dose is 12 tablets in 24 hours.    Call a doctor for any of the following:  Signs of infection (fever, growing tenderness at the surgery site, a large amount of drainage or bleeding, severe pain, foul-smelling drainage, redness, swelling).  It has been over 8 to 10 hours since surgery and you are still not able to urinate (pass water).  Headache for over 24 hours.  Numbness, tingling or weakness the day after surgery (if you had spinal anesthesia).  Signs of Covid-19 infection (temperature over 100 degrees, shortness of breath, cough, loss of taste/smell, generalized body aches, persistent headache, chills, sore throat, nausea/vomiting/diarrhea)  Your doctor is:  Dr. Carlton Samayoa, ENT Otolaryngology: [phone]                    Or dial [phone] and ask for the resident on call for:  ENT Otolaryngology Surgery  For emergency care, call the:  [facility]:  [phone] (TTY for hearing impaired: [phone])

## 2024-08-07 LAB
PATH REPORT.COMMENTS IMP SPEC: NORMAL
PATH REPORT.COMMENTS IMP SPEC: NORMAL
PATH REPORT.FINAL DX SPEC: NORMAL
PATH REPORT.GROSS SPEC: NORMAL
PATH REPORT.MICROSCOPIC SPEC OTHER STN: NORMAL
PATH REPORT.RELEVANT HX SPEC: NORMAL
PHOTO IMAGE: NORMAL

## 2024-08-09 ENCOUNTER — OFFICE VISIT (OUTPATIENT)
Dept: OTOLARYNGOLOGY | Facility: CLINIC | Age: 64
End: 2024-08-09
Payer: COMMERCIAL

## 2024-08-09 DIAGNOSIS — J32.3 CHRONIC SPHENOIDAL SINUSITIS: Primary | ICD-10-CM

## 2024-08-09 DIAGNOSIS — J34.89 NASAL CRUSTING: ICD-10-CM

## 2024-08-09 PROCEDURE — 31237 NSL/SINS NDSC SURG BX POLYPC: CPT | Mod: RT | Performed by: STUDENT IN AN ORGANIZED HEALTH CARE EDUCATION/TRAINING PROGRAM

## 2024-08-09 PROCEDURE — 99213 OFFICE O/P EST LOW 20 MIN: CPT | Mod: 25 | Performed by: STUDENT IN AN ORGANIZED HEALTH CARE EDUCATION/TRAINING PROGRAM

## 2024-08-09 ASSESSMENT — PAIN SCALES - GENERAL: PAINLEVEL: MILD PAIN (2)

## 2024-08-09 NOTE — LETTER
8/9/2024       RE: Janeen Gregg  [address]  [place] [address]     Dear Colleague,    Thank you for referring your patient, Janeen Gregg, to the [facility] [place] at [facility]. Please see a copy of my visit note below.      [facility] - Rhinology  Established Patient Visit      Encounter date:   8/9/2024    Assessment, Decision Making, and Plan:  (J32.3) Chronic sphenoidal sinusitis  (primary encounter diagnosis)  (J34.89) Nasal crusting  Comment:   --right chronic sphenoid sinusitis with mycetoma s/p revision FESS  --healing well, debrided sphenoid patent, no mycetoma  Plan: IMAGESTREAM RECORDING ORDER  --saline rinses    Operative History:  7/31/2024 (NRG)  Procedures performed:  1.  Right endoscopic maxillary antrostomy  2.  Right endoscopic ethmoidectomy, total  3.  Right endoscopic sphenoidotomy with removal of rhinolith/mycetoma  4.  Right inferior turbinate outfracture  5.  Use of intraoperative image guidance     Findings:   Complete removal of right sphenoid rhinolith/mycetoma    Interval:  Doing well    History of Present Illness (copied from initial consultation):   Janeen Gregg is a 63 year old female who presents for consultation regarding chronic sphenoid sinusitis    Headache and post nasal drip occasionally  Was found to have likely right sphenoid fungal ball  Underwent right CB removal/septoplasty/sphenoid - persistence of symptoms and imaging demonstrated continued fungal ball    Did well after surgery, no major issues    Review of systems: A 14-point review of systems has been conducted and was negative for any notable symptoms, except as dictated in the history of present illness.     Medical History:  No past medical history on file.     Surgical History:   Past Surgical History:   Procedure Laterality Date     IR LUMBAR PUNCTURE  5/21/2021     IR LUMBAR PUNCTURE  9/16/2022      IR LUMBAR PUNCTURE  1/4/2019     IR LUMBAR PUNCTURE  12/18/2020     IR LUMBAR PUNCTURE  1/12/2021        Family History:  No family history on file.     Social History:   Social History     Socioeconomic History     Marital status:      Social Determinants of Health     Financial Resource Strain: Not At Risk (10/20/2023)    Received from [facility]    Financial Resource Strain      Is it hard for you to pay for the very basics like food, housing, medical care or heating?: No   Food Insecurity: Not At Risk (10/20/2023)    [facility]    Food Insecurity      Does your food run out before you have the money to buy more?: No   Transportation Needs: Not At Risk (10/20/2023)    [facility]    Transportation Needs      Does a lack of transportation keep you from your medical appointments or from getting your medications?: No        Physical Exam:  There were no vitals taken for this visit.    General Appearance: No acute distress, appropriate demeanor, conversant  Eyes: moist conjunctivae; EOMI; pupils symmetric; visual acuity grossly intact; no proptosis  Head: normocephalic; overall symmetric appearance without deformity  Face: overall symmetric without deformity  Ears: Normal appearance of external ear; external meatus normal in appearance; TMs intact without perforation bilaterally;   Nose: No external deformity; septum (not significantly deviated) ; inferior turbinates (non hypertrophic)   Oral Cavity/oropharynx: Normal appearance of mucosa; tongue midline; no mass or lesions; oropharynx without obvious mucosal abnormality  Neck: no palpable lymphadenopathy; thyroid without palpable nodules  Lungs: symmetric chest rise; no wheezing  CV: Good distal perfusion; normal hear rate  Extremities: No deformity  Neurologic Exam: Cranial nerves II-XII are grossly intact; no focal deficit    Procedure Note  Procedure performed: Rigid  nasal endoscopy, debridement  Indication: To evaluate for sinonasal pathology not visualized on routine anterior rhinoscopy  Anesthesia: 4% topical lidocaine with 0.05% oxymetazoline  Description of procedure: A 30 degree, 3 mm rigid endoscope was inserted into bilateral nasal cavities and the nasal valves, nasal cavity, middle meatus, sphenoethmoid recess, and nasopharynx were thoroughly evaluated for evidence of obstruction, edema, purulence, polyps and/or mass/lesion.     Findings:    Packing and crust removed from right middle meatus  Max, ethmoid, sphenoid patent    The patient tolerated the procedure well without complication.     Imaging Review:  3/15/2024 - CT sinus - primary review demonstrates persistent right sphenoid partial opacification with heterogenous signal suggestive of mycetoma/rhinolith    Carlton Samayoa MD    [facility]  Rhinology  Endoscopic Skull Base Surgery  [facility]  Department of Otolaryngology - Head & Neck Surgery      Again, thank you for allowing me to participate in the care of your patient.      Sincerely,    Carlton Samayoa MD

## 2024-08-09 NOTE — PATIENT INSTRUCTIONS
You were seen in the ENT Clinic today by Dr. Samayoa. If you have any questions or concerns after your appointment, please contact us (see below)    The following has been recommended for you based upon your appointment today:  Continue nasal rinses  Okay to resume regular activity     Please return to clinic in 6 weeks for follow up with Dr. Samayoa     How to Contact Us:  Send a [employer] message to your provider. Our team will respond to you via [employer]. Occasionally, we will need to call you to get further information.  For urgent matters (Monday-Friday), call the ENT Clinic: [phone] and speak with a call center team member - they will route your call appropriately.   If you'd like to speak directly with a nurse, please find our contact information below. We do our best to check voicemail frequently throughout the day, and will work to call you back within 1-2 days. For urgent matters, please use the general clinic phone numbers listed above.    Patricia THOMAS RN, BSN   RN Care Coordinator, ENT Clinic  [facility]  Direct: [phone]  Yareli KRUGER LPN  Direct: [phone]

## 2024-08-10 NOTE — PROGRESS NOTES
[facility] - Rhinology  Established Patient Visit      Encounter date:   8/9/2024    Assessment, Decision Making, and Plan:  (J32.3) Chronic sphenoidal sinusitis  (primary encounter diagnosis)  (J34.89) Nasal crusting  Comment:   --right chronic sphenoid sinusitis with mycetoma s/p revision FESS  --healing well, debrided sphenoid patent, no mycetoma  Plan: IMAGESTREAM RECORDING ORDER  --saline rinses    Operative History:  7/31/2024 (NRG)  Procedures performed:  1.  Right endoscopic maxillary antrostomy  2.  Right endoscopic ethmoidectomy, total  3.  Right endoscopic sphenoidotomy with removal of rhinolith/mycetoma  4.  Right inferior turbinate outfracture  5.  Use of intraoperative image guidance     Findings:   Complete removal of right sphenoid rhinolith/mycetoma    Interval:  Doing well    History of Present Illness (copied from initial consultation):   Janeen Gregg is a 63 year old female who presents for consultation regarding chronic sphenoid sinusitis    Headache and post nasal drip occasionally  Was found to have likely right sphenoid fungal ball  Underwent right CB removal/septoplasty/sphenoid - persistence of symptoms and imaging demonstrated continued fungal ball    Did well after surgery, no major issues    Review of systems: A 14-point review of systems has been conducted and was negative for any notable symptoms, except as dictated in the history of present illness.     Medical History:  No past medical history on file.     Surgical History:   Past Surgical History:   Procedure Laterality Date    IR LUMBAR PUNCTURE  5/21/2021    IR LUMBAR PUNCTURE  9/16/2022    IR LUMBAR PUNCTURE  1/4/2019    IR LUMBAR PUNCTURE  12/18/2020    IR LUMBAR PUNCTURE  1/12/2021        Family History:  No family history on file.     Social History:   Social History     Socioeconomic History    Marital status:      Social Determinants of Health     Financial Resource Strain: Not At Risk (10/20/2023)     Received from [facility]    Financial Resource Strain     Is it hard for you to pay for the very basics like food, housing, medical care or heating?: No   Food Insecurity: Not At Risk (10/20/2023)    [facility]    Food Insecurity     Does your food run out before you have the money to buy more?: No   Transportation Needs: Not At Risk (10/20/2023)    [facility]    Transportation Needs     Does a lack of transportation keep you from your medical appointments or from getting your medications?: No        Physical Exam:  There were no vitals taken for this visit.    General Appearance: No acute distress, appropriate demeanor, conversant  Eyes: moist conjunctivae; EOMI; pupils symmetric; visual acuity grossly intact; no proptosis  Head: normocephalic; overall symmetric appearance without deformity  Face: overall symmetric without deformity  Ears: Normal appearance of external ear; external meatus normal in appearance; TMs intact without perforation bilaterally;   Nose: No external deformity; septum (not significantly deviated) ; inferior turbinates (non hypertrophic)   Oral Cavity/oropharynx: Normal appearance of mucosa; tongue midline; no mass or lesions; oropharynx without obvious mucosal abnormality  Neck: no palpable lymphadenopathy; thyroid without palpable nodules  Lungs: symmetric chest rise; no wheezing  CV: Good distal perfusion; normal hear rate  Extremities: No deformity  Neurologic Exam: Cranial nerves II-XII are grossly intact; no focal deficit    Procedure Note  Procedure performed: Rigid nasal endoscopy, debridement  Indication: To evaluate for sinonasal pathology not visualized on routine anterior rhinoscopy  Anesthesia: 4% topical lidocaine with 0.05% oxymetazoline  Description of procedure: A 30 degree, 3 mm rigid endoscope was inserted into bilateral nasal cavities and the nasal valves, nasal cavity, middle  meatus, sphenoethmoid recess, and nasopharynx were thoroughly evaluated for evidence of obstruction, edema, purulence, polyps and/or mass/lesion.     Findings:    Packing and crust removed from right middle meatus  Max, ethmoid, sphenoid patent    The patient tolerated the procedure well without complication.     Imaging Review:  3/15/2024 - CT sinus - primary review demonstrates persistent right sphenoid partial opacification with heterogenous signal suggestive of mycetoma/rhinolith    Carlton Samayoa MD    [facility]  Rhinology  Endoscopic Skull Base Surgery  [facility]  Department of Otolaryngology - Head & Neck Surgery

## 2024-08-12 ENCOUNTER — TELEPHONE (OUTPATIENT)
Dept: OTOLARYNGOLOGY | Facility: CLINIC | Age: 64
End: 2024-08-12
Payer: COMMERCIAL

## 2024-08-12 NOTE — TELEPHONE ENCOUNTER
Patient confirmed scheduled appointment:  Date: 10/11/24  Time: 4:00pm  Visit type: Return ENT   Provider: Dr. Samayoa  Location: [facility]  Testing/imaging:   Additional notes:

## 2024-10-11 ENCOUNTER — OFFICE VISIT (OUTPATIENT)
Dept: OTOLARYNGOLOGY | Facility: CLINIC | Age: 64
End: 2024-10-11
Attending: STUDENT IN AN ORGANIZED HEALTH CARE EDUCATION/TRAINING PROGRAM
Payer: COMMERCIAL

## 2024-10-11 VITALS
SYSTOLIC BLOOD PRESSURE: 121 MMHG | OXYGEN SATURATION: 98 % | HEIGHT: 64 IN | DIASTOLIC BLOOD PRESSURE: 69 MMHG | HEART RATE: 77 BPM | WEIGHT: 207.7 LBS | BODY MASS INDEX: 35.46 KG/M2

## 2024-10-11 DIAGNOSIS — J32.3 CHRONIC SPHENOIDAL SINUSITIS: Primary | ICD-10-CM

## 2024-10-11 DIAGNOSIS — J34.89 NASAL CRUSTING: ICD-10-CM

## 2024-10-11 PROCEDURE — 31231 NASAL ENDOSCOPY DX: CPT | Performed by: STUDENT IN AN ORGANIZED HEALTH CARE EDUCATION/TRAINING PROGRAM

## 2024-10-11 PROCEDURE — 99213 OFFICE O/P EST LOW 20 MIN: CPT | Mod: 25 | Performed by: STUDENT IN AN ORGANIZED HEALTH CARE EDUCATION/TRAINING PROGRAM

## 2024-10-11 RX ORDER — CHLORHEXIDINE GLUCONATE ORAL RINSE 1.2 MG/ML
SOLUTION DENTAL
COMMUNITY
Start: 2024-09-25

## 2024-10-11 RX ORDER — PREDNISOLONE ACETATE 10 MG/ML
SUSPENSION/ DROPS OPHTHALMIC
COMMUNITY
Start: 2024-09-09

## 2024-10-11 ASSESSMENT — PAIN SCALES - GENERAL: PAINLEVEL: NO PAIN (0)

## 2024-10-11 NOTE — NURSING NOTE
"Chief Complaint   Patient presents with    RECHECK   Blood pressure 121/69, pulse 77, height 1.626 m (5' 4\"), weight 94.2 kg (207 lb 11.2 oz), SpO2 98%, not currently breastfeeding. Nasir Friedman, EMT    "

## 2024-10-11 NOTE — PATIENT INSTRUCTIONS
You were seen in the ENT Clinic today by Dr. Samayoa. If you have any questions or concerns after your appointment, please contact us (see below)    Please return to clinic as needed    How to Contact Us:  Send a [employer] message to your provider. Our team will respond to you via [employer]. Occasionally, we will need to call you to get further information.  For urgent matters (Monday-Friday), call the ENT Clinic: [phone] and speak with a call center team member - they will route your call appropriately.   If you'd like to speak directly with a nurse, please find our contact information below. We do our best to check voicemail frequently throughout the day, and will work to call you back within 1-2 days. For urgent matters, please use the general clinic phone numbers listed above.    Patricia THOMAS RN, BSN   RN Care Coordinator, ENT Clinic  [facility]  Direct: [phone]  Yareli KRUGER LPN  Direct: [phone]

## 2024-10-11 NOTE — PROGRESS NOTES
[facility] - Rhinology  Established Patient Visit      Encounter date:   10/11/2024    Assessment, Decision Making, and Plan:  (J32.3) Chronic sphenoidal sinusitis  (primary encounter diagnosis)  (J34.89) Nasal crusting  Comment:   --very well healed, no crusting, no mycetoma  Plan: IMAGESTREAM RECORDING ORDER  --can restart flonase  --follow up as needed    Operative History:  7/31/2024 (NRG)  Procedures performed:  1.  Right endoscopic maxillary antrostomy  2.  Right endoscopic ethmoidectomy, total  3.  Right endoscopic sphenoidotomy with removal of rhinolith/mycetoma  4.  Right inferior turbinate outfracture  5.  Use of intraoperative image guidance     Findings:   Complete removal of right sphenoid rhinolith/mycetoma    Interval:  Doing well    History of Present Illness (copied from initial consultation):   Janeen Gregg is a 63 year old female who presents for consultation regarding chronic sphenoid sinusitis    Headache and post nasal drip occasionally  Was found to have likely right sphenoid fungal ball  Underwent right CB removal/septoplasty/sphenoid - persistence of symptoms and imaging demonstrated continued fungal ball    Did well after surgery, no major issues    Review of systems: A 14-point review of systems has been conducted and was negative for any notable symptoms, except as dictated in the history of present illness.     Medical History:  No past medical history on file.     Surgical History:   Past Surgical History:   Procedure Laterality Date    IR LUMBAR PUNCTURE  5/21/2021    IR LUMBAR PUNCTURE  9/16/2022    IR LUMBAR PUNCTURE  1/4/2019    IR LUMBAR PUNCTURE  12/18/2020    IR LUMBAR PUNCTURE  1/12/2021        Family History:  No family history on file.     Social History:   Social History     Socioeconomic History    Marital status:      Social Determinants of Health     Financial Resource Strain: Not At Risk (10/20/2023)    [facility]     Financial Resource Strain     Is it hard for you to pay for the very basics like food, housing, medical care or heating?: No   Food Insecurity: Not At Risk (10/20/2023)    Received from [facility]    Food Insecurity     Does your food run out before you have the money to buy more?: No   Transportation Needs: Not At Risk (10/20/2023)    [facility]    Transportation Needs     Does a lack of transportation keep you from your medical appointments or from getting your medications?: No        Physical Exam:  There were no vitals taken for this visit.    General Appearance: No acute distress, appropriate demeanor, conversant  Eyes: moist conjunctivae; EOMI; pupils symmetric; visual acuity grossly intact; no proptosis  Head: normocephalic; overall symmetric appearance without deformity  Face: overall symmetric without deformity  Ears: Normal appearance of external ear; external meatus normal in appearance; TMs intact without perforation bilaterally;   Nose: No external deformity; septum (not significantly deviated) ; inferior turbinates (non hypertrophic)   Oral Cavity/oropharynx: Normal appearance of mucosa; tongue midline; no mass or lesions; oropharynx without obvious mucosal abnormality  Neck: no palpable lymphadenopathy; thyroid without palpable nodules  Lungs: symmetric chest rise; no wheezing  CV: Good distal perfusion; normal hear rate  Extremities: No deformity  Neurologic Exam: Cranial nerves II-XII are grossly intact; no focal deficit    Procedure Note  Procedure performed: Rigid nasal endoscopy  Indication: To evaluate for sinonasal pathology not visualized on routine anterior rhinoscopy  Anesthesia: 4% topical lidocaine with 0.05% oxymetazoline  Description of procedure: A 30 degree, 3 mm rigid endoscope was inserted into bilateral nasal cavities and the nasal valves, nasal cavity, middle meatus, sphenoethmoid recess, and nasopharynx were thoroughly  evaluated for evidence of obstruction, edema, purulence, polyps and/or mass/lesion.     Findings:    Maxillary, ethmoid, and sphenoid widely patent  No crusting, no mycetoma    The patient tolerated the procedure well without complication.     Imaging Review:  3/15/2024 - CT sinus - primary review demonstrates persistent right sphenoid partial opacification with heterogenous signal suggestive of mycetoma/rhinolith    Carlton Samayoa MD    [facility]  Rhinology  Endoscopic Skull Base Surgery  [facility]  Department of Otolaryngology - Head & Neck Surgery

## 2024-10-11 NOTE — LETTER
10/11/2024       RE: Janeen Gregg  [address]  [place] [address]     Dear Colleague,    Thank you for referring your patient, Janeen Gregg, to the [facility] EAR NOSE AND [facility]AT [facility] [place] at [facility]. Please see a copy of my visit note below.      [facility] - Rhinology  Established Patient Visit      Encounter date:   10/11/2024    Assessment, Decision Making, and Plan:  (J32.3) Chronic sphenoidal sinusitis  (primary encounter diagnosis)  (J34.89) Nasal crusting  Comment:   --very well healed, no crusting, no mycetoma  Plan: IMAGESTREAM RECORDING ORDER  --can restart flonase  --follow up as needed    Operative History:  7/31/2024 (NRG)  Procedures performed:  1.  Right endoscopic maxillary antrostomy  2.  Right endoscopic ethmoidectomy, total  3.  Right endoscopic sphenoidotomy with removal of rhinolith/mycetoma  4.  Right inferior turbinate outfracture  5.  Use of intraoperative image guidance     Findings:   Complete removal of right sphenoid rhinolith/mycetoma    Interval:  Doing well    History of Present Illness (copied from initial consultation):   Janeen Gregg is a 63 year old female who presents for consultation regarding chronic sphenoid sinusitis    Headache and post nasal drip occasionally  Was found to have likely right sphenoid fungal ball  Underwent right CB removal/septoplasty/sphenoid - persistence of symptoms and imaging demonstrated continued fungal ball    Did well after surgery, no major issues    Review of systems: A 14-point review of systems has been conducted and was negative for any notable symptoms, except as dictated in the history of present illness.     Medical History:  No past medical history on file.     Surgical History:   Past Surgical History:   Procedure Laterality Date     IR LUMBAR PUNCTURE  5/21/2021     IR LUMBAR PUNCTURE  9/16/2022     IR LUMBAR PUNCTURE  1/4/2019     IR LUMBAR  PUNCTURE  12/18/2020     IR LUMBAR PUNCTURE  1/12/2021        Family History:  No family history on file.     Social History:   Social History     Socioeconomic History     Marital status:      Social Determinants of Health     Financial Resource Strain: Not At Risk (10/20/2023)    [facility] [facility]    Financial Resource Strain      Is it hard for you to pay for the very basics like food, housing, medical care or heating?: No   Food Insecurity: Not At Risk (10/20/2023)    [facility]    Food Insecurity      Does your food run out before you have the money to buy more?: No   Transportation Needs: Not At Risk (10/20/2023)    Received from [facility]    Transportation Needs      Does a lack of transportation keep you from your medical appointments or from getting your medications?: No        Physical Exam:  There were no vitals taken for this visit.    General Appearance: No acute distress, appropriate demeanor, conversant  Eyes: moist conjunctivae; EOMI; pupils symmetric; visual acuity grossly intact; no proptosis  Head: normocephalic; overall symmetric appearance without deformity  Face: overall symmetric without deformity  Ears: Normal appearance of external ear; external meatus normal in appearance; TMs intact without perforation bilaterally;   Nose: No external deformity; septum (not significantly deviated) ; inferior turbinates (non hypertrophic)   Oral Cavity/oropharynx: Normal appearance of mucosa; tongue midline; no mass or lesions; oropharynx without obvious mucosal abnormality  Neck: no palpable lymphadenopathy; thyroid without palpable nodules  Lungs: symmetric chest rise; no wheezing  CV: Good distal perfusion; normal hear rate  Extremities: No deformity  Neurologic Exam: Cranial nerves II-XII are grossly intact; no focal deficit    Procedure Note  Procedure performed: Rigid nasal endoscopy  Indication: To evaluate for  sinonasal pathology not visualized on routine anterior rhinoscopy  Anesthesia: 4% topical lidocaine with 0.05% oxymetazoline  Description of procedure: A 30 degree, 3 mm rigid endoscope was inserted into bilateral nasal cavities and the nasal valves, nasal cavity, middle meatus, sphenoethmoid recess, and nasopharynx were thoroughly evaluated for evidence of obstruction, edema, purulence, polyps and/or mass/lesion.     Findings:    Maxillary, ethmoid, and sphenoid widely patent  No crusting, no mycetoma    The patient tolerated the procedure well without complication.     Imaging Review:  3/15/2024 - CT sinus - primary review demonstrates persistent right sphenoid partial opacification with heterogenous signal suggestive of mycetoma/rhinolith    Carlton Samayoa MD    [facility]  Rhinology  Endoscopic Skull Base Surgery  [facility]  Department of Otolaryngology - Head & Neck Surgery      Again, thank you for allowing me to participate in the care of your patient.      Sincerely,    Carlton Samayoa MD

## 2024-12-15 ENCOUNTER — HEALTH MAINTENANCE LETTER (OUTPATIENT)
Age: 64
End: 2024-12-15

## (undated) DEVICE — DRAPE WARMER 66X44" ORS-300

## (undated) DEVICE — LINEN TOWEL PACK X5 5464

## (undated) DEVICE — SYR 30ML LL W/O NDL 302832

## (undated) DEVICE — ESU GROUND PAD ADULT W/CORD E7507

## (undated) DEVICE — GLOVE PROTEXIS MICRO 7.0  2D73PM70

## (undated) DEVICE — SPECIMEN TRAP STRYKER NEPTUNE IN-LINE 0700-050-000

## (undated) DEVICE — STPL SKIN 35W ROTATING HEAD PRW35

## (undated) DEVICE — BLADE SHAVER SINUS 4MM RAD 12DEG CVD 1884012HR

## (undated) DEVICE — PACK ENT ENDOSCOPY CUSTOM ASC

## (undated) DEVICE — BLADE QUADCUT 4.3MM X 13CM NON NAVIGATED 1884380HRE

## (undated) DEVICE — TUBING IRRIGATOR STRAIGHTSHOT XPS 1895522

## (undated) DEVICE — SOL NACL 0.9% IRRIG 500ML BOTTLE 2F7123

## (undated) DEVICE — SPLINT INTRANASAL POSISEPX GEL SPONGE 9210584

## (undated) DEVICE — DRSG HOLDER NASAL DALE 600

## (undated) DEVICE — SUCTION MANIFOLD NEPTUNE 2 SYS 1 PORT 702-025-000

## (undated) DEVICE — STRAP KNEE/BODY 31143004

## (undated) DEVICE — TRACKER NAVIGATION CRANIALMASK 6000-390-000

## (undated) DEVICE — TUBING SUCTION 10'X3/16" N510

## (undated) DEVICE — SPONGE COTTONOID 2X1/2" 80-1406

## (undated) DEVICE — ENDO SHEATH STORZ SHARPSITE ENDOSCRUB 30DEG 4MM 1912010

## (undated) DEVICE — BLADE QUADCUT ROTATABLE FUSION 4.3MMX13CM M4 1884380EM

## (undated) DEVICE — ESU HOLSTER PLASTIC DISP E2400

## (undated) DEVICE — SOL NACL 0.9% INJ 1000ML BAG 2B1324X

## (undated) DEVICE — ENDO SHEATH STORZ SHARPSITE ENDOSCRUB 0DEG 4MM 1912000

## (undated) RX ORDER — DEXMEDETOMIDINE HYDROCHLORIDE 4 UG/ML
INJECTION, SOLUTION INTRAVENOUS
Status: DISPENSED
Start: 2024-07-31

## (undated) RX ORDER — OXYMETAZOLINE HYDROCHLORIDE 0.05 G/100ML
SPRAY NASAL
Status: DISPENSED
Start: 2024-07-31

## (undated) RX ORDER — PROPOFOL 10 MG/ML
INJECTION, EMULSION INTRAVENOUS
Status: DISPENSED
Start: 2024-07-31

## (undated) RX ORDER — DEXAMETHASONE SODIUM PHOSPHATE 10 MG/ML
INJECTION, SOLUTION INTRAMUSCULAR; INTRAVENOUS
Status: DISPENSED
Start: 2024-07-31

## (undated) RX ORDER — CEFAZOLIN SODIUM 2 G/50ML
SOLUTION INTRAVENOUS
Status: DISPENSED
Start: 2024-07-31

## (undated) RX ORDER — HYDROMORPHONE HYDROCHLORIDE 1 MG/ML
INJECTION, SOLUTION INTRAMUSCULAR; INTRAVENOUS; SUBCUTANEOUS
Status: DISPENSED
Start: 2024-07-31

## (undated) RX ORDER — OXYCODONE HYDROCHLORIDE 5 MG/1
TABLET ORAL
Status: DISPENSED
Start: 2024-07-31

## (undated) RX ORDER — LIDOCAINE HYDROCHLORIDE AND EPINEPHRINE 10; 10 MG/ML; UG/ML
INJECTION, SOLUTION INFILTRATION; PERINEURAL
Status: DISPENSED
Start: 2024-07-31

## (undated) RX ORDER — FENTANYL CITRATE 50 UG/ML
INJECTION, SOLUTION INTRAMUSCULAR; INTRAVENOUS
Status: DISPENSED
Start: 2024-07-31

## (undated) RX ORDER — ONDANSETRON 2 MG/ML
INJECTION INTRAMUSCULAR; INTRAVENOUS
Status: DISPENSED
Start: 2024-07-31

## (undated) RX ORDER — ACETAMINOPHEN 325 MG/1
TABLET ORAL
Status: DISPENSED
Start: 2024-07-31